# Patient Record
Sex: FEMALE | Race: WHITE | NOT HISPANIC OR LATINO | URBAN - METROPOLITAN AREA
[De-identification: names, ages, dates, MRNs, and addresses within clinical notes are randomized per-mention and may not be internally consistent; named-entity substitution may affect disease eponyms.]

---

## 2019-11-06 ENCOUNTER — ON CAMPUS - OUTPATIENT (AMBULATORY)
Dept: URBAN - METROPOLITAN AREA HOSPITAL 2 | Facility: HOSPITAL | Age: 69
End: 2019-11-06
Payer: MEDICARE

## 2019-11-06 ENCOUNTER — LAB REQUISITION (OUTPATIENT)
Dept: LAB | Facility: HOSPITAL | Age: 69
End: 2019-11-06

## 2019-11-06 ENCOUNTER — OFFICE (AMBULATORY)
Dept: URBAN - METROPOLITAN AREA PATHOLOGY 4 | Facility: PATHOLOGY | Age: 69
End: 2019-11-06
Payer: MEDICARE

## 2019-11-06 VITALS
WEIGHT: 141.4 LBS | SYSTOLIC BLOOD PRESSURE: 91 MMHG | HEART RATE: 95 BPM | DIASTOLIC BLOOD PRESSURE: 70 MMHG | HEART RATE: 73 BPM | SYSTOLIC BLOOD PRESSURE: 101 MMHG | HEIGHT: 64 IN | HEART RATE: 76 BPM | SYSTOLIC BLOOD PRESSURE: 124 MMHG | OXYGEN SATURATION: 100 % | TEMPERATURE: 97.1 F | DIASTOLIC BLOOD PRESSURE: 71 MMHG | DIASTOLIC BLOOD PRESSURE: 54 MMHG | HEART RATE: 70 BPM | SYSTOLIC BLOOD PRESSURE: 108 MMHG | OXYGEN SATURATION: 98 % | RESPIRATION RATE: 16 BRPM | DIASTOLIC BLOOD PRESSURE: 47 MMHG | SYSTOLIC BLOOD PRESSURE: 83 MMHG | RESPIRATION RATE: 15 BRPM | HEART RATE: 86 BPM | SYSTOLIC BLOOD PRESSURE: 115 MMHG | SYSTOLIC BLOOD PRESSURE: 98 MMHG | RESPIRATION RATE: 18 BRPM | DIASTOLIC BLOOD PRESSURE: 63 MMHG | OXYGEN SATURATION: 99 %

## 2019-11-06 DIAGNOSIS — K57.30 DIVERTICULOSIS OF LARGE INTESTINE WITHOUT PERFORATION OR ABSCESS WITHOUT BLEEDING: ICD-10-CM

## 2019-11-06 DIAGNOSIS — K57.30 DIVERTICULOSIS OF LARGE INTESTINE WITHOUT PERFORATION OR ABS: ICD-10-CM

## 2019-11-06 DIAGNOSIS — Z12.11 ENCOUNTER FOR SCREENING FOR MALIGNANT NEOPLASM OF COLON: ICD-10-CM

## 2019-11-06 DIAGNOSIS — D12.5 BENIGN NEOPLASM OF SIGMOID COLON: ICD-10-CM

## 2019-11-06 DIAGNOSIS — D12.2 BENIGN NEOPLASM OF ASCENDING COLON: ICD-10-CM

## 2019-11-06 DIAGNOSIS — D12.3 BENIGN NEOPLASM OF TRANSVERSE COLON: ICD-10-CM

## 2019-11-06 DIAGNOSIS — K64.8 OTHER HEMORRHOIDS: ICD-10-CM

## 2019-11-06 LAB
GI HISTOLOGY: A. UNSPECIFIED: (no result)
GI HISTOLOGY: B. UNSPECIFIED: (no result)
GI HISTOLOGY: C. UNSPECIFIED: (no result)
GI HISTOLOGY: PDF REPORT: (no result)

## 2019-11-06 PROCEDURE — 88305 TISSUE EXAM BY PATHOLOGIST: CPT | Performed by: INTERNAL MEDICINE

## 2019-11-06 PROCEDURE — 88305 TISSUE EXAM BY PATHOLOGIST: CPT | Mod: 26 | Performed by: INTERNAL MEDICINE

## 2019-11-06 PROCEDURE — 45385 COLONOSCOPY W/LESION REMOVAL: CPT | Performed by: INTERNAL MEDICINE

## 2019-11-07 LAB
LAB AP CASE REPORT: NORMAL
PATH REPORT.FINAL DX SPEC: NORMAL
PATH REPORT.GROSS SPEC: NORMAL

## 2021-12-08 ENCOUNTER — VIRTUAL VISIT (OUTPATIENT)
Dept: CONSULT | Facility: CLINIC | Age: 71
End: 2021-12-08
Payer: COMMERCIAL

## 2021-12-08 DIAGNOSIS — I42.8 ARRHYTHMOGENIC RIGHT VENTRICULAR CARDIOMYOPATHY (H): ICD-10-CM

## 2021-12-08 DIAGNOSIS — Z84.89 FAMILY HISTORY OF GENETIC DISEASE: Primary | ICD-10-CM

## 2021-12-08 NOTE — PROGRESS NOTES
How would you like to obtain your AVS? Mail a copy  If the video visit is dropped, the invitation should be resent by: Text to cell phone: [phone]  Will anyone else be joining your video visit? Rebekah Bolanos, EMT

## 2021-12-08 NOTE — PROGRESS NOTES
Name:  Mireya Toro  :   1950  MRN:   [id number]  Date of service: Dec 8, 2021  Primary Provider: No primary care provider on file.  Referring Provider: No ref. provider found    PRESENTING INFORMATION   Reason for consultation:  A consultation in the [facility] was requested for Mireya, a 71 year old female, for targeted familial variant testing     Mireya was accompanied to this visit by her .     History is obtained from Patient and electronic health record. I met with the family at the request of Dr. Edmond to obtain a personal and family history, discuss possible genetic contributions to her symptoms, and to obtain informed consent for genetic testing if indicated.       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  Her son was also found to have a variant of uncertain significance in RYR2 c.4692G>A ([id number]).  This variant in her son does not provide a diagnosis, but further familial segregation studies may be beneficial in classifying this variant.  It is unlikely that familial segregation studies on their own would be sufficient to reclassify this variant, but as more individuals have genetic testing and further research is done on this gene, this may aid in reclassification.  Reclassification could lead to the variant being determined to be benign or pathogenic.  If it is benign, this is not expected to impact health care.  It is it is pathogenic, this can lead to medical management changes due to an increased risk of arrhythmia.  Mireya verbalized understanding that the presence of the variant would not change medical management at this time.  She provided informed consent for genetic testing.     Plan  1. Ordered targeted testing for variants of uncertain significance above and other pathogenic  variants within these same genes at [facility]  Orders Placed This Encounter   Procedures     Other Laboratory; [facility]; Familial Variant Next Generation Sequencing RYR2 (NO TEST CODE) DO NOT BILL PATIENT. (Laboratory Miscellaneous Order)      2. Buccal swab sent to home for sample collection.  3. Pending receipt of sample, results will be returned in approximately 3 weeks and I will call Mireya at that time  4. No further questions.  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 Her son, Rodri was the proband in the family. Mireya had targeted genetic testing following this which identified the presence of the DSP variant.  Rodri was also found to have a variant of uncertain significance in RYR2.  No other relatives have completed testing for this variant.    Mireya has wolly hair similar to her affected son and grandson. She recently had a Holter monitor completed due to DSP variant/ARVC.    She has not been tested for the RYR2 variant.    DISCUSSION  Today we reviewed that our genetic material or DNA is responsible for how our bodies grow and develop. It can be thought of as an instruction manual. This instruction manual is made up of chapters called genes. Our genes are inherited on structures called chromosomes, of which we have 23 pairs for a total of 46. For each chromosome pair, one copy is inherited from the mother and one is inherited from the father. The chromosome pairs are numbered from 1 to 22, and the 23rd pair of chromsomes is called the sex chromsomes. These determine if we are a male or female.     Changes in the chromosomes or in the DNA sequence of a gene can cause the signs and symptoms of a genetic condition because the instructions it is providing to the body have been altered. This can be a small spelling error in the gene, a large duplicated piece of information, or a large missing piece of information.     Today we  reviewed that pathogenic variants may provide a diagnosis whereas variants of uncertain significance do not provide a diagnosis. It takes time for these to be reclassified. Sometimes, familial testing is sufficient to have it reclassified. For cardiac variant with variable ages of onset and reduced penetrance, this is less likely to be the case.  If the variant is reclassified as benign in the future, it would not be expected to impact that person's health.  Changes to health care would therefore not be indicated.  Alternatively, if the variant is upgraded to pathogenic, this provides a diagnosis and may lead to medical management changes.  If and when the lab updates the variant, they will reach out to inform providers of this change.  Nonetheless, we recommended that individuals with variants of uncertain significance call to check in with us every 2 to 5 years to inquire about changes in classification.    Familial variant testing will be sent to [facility].  [facility] sequences the full gene and reports out not only the familial variant but also other pathogenic variants.  We do not expect to find alternative pathogenic variants, but if there is one identified with this testing, it will be disclosed to the patient.    After reviewing the information, patient elected to proceed with genetic testing.  Informed consent obtained and signed with verbal consent.    Billing reviewed with patient.  [facility] does not bill Medicare patients, so bill is not expected from this testing.  If there is a potential charge, [facility] will reach out to patient via text.  Patient may cancel testing at that time.      Sandi Gooden [facility]  Genetic Counselor   [facility]   Phone: [phone]     Time spent in consultation: 30 minutes    CC: Patient

## 2021-12-12 ENCOUNTER — HEALTH MAINTENANCE LETTER (OUTPATIENT)
Age: 71
End: 2021-12-12

## 2022-01-07 ENCOUNTER — TELEPHONE (OUTPATIENT)
Dept: CONSULT | Facility: CLINIC | Age: 72
End: 2022-01-07
Payer: COMMERCIAL

## 2022-01-07 NOTE — TELEPHONE ENCOUNTER
Reason for Call  Called Mireya to discuss the results of Mireya's genetic testing for the familial RYR2 variant of uncertain significance identified in her son, Rodri.     Results  Next Generation Sequencing (NGS) for RYR2 was completed at [facility]. These results were negative. The variant of uncertain significance, RYR2 c.4692G>A ([id number]), was NOT identified in Mireya's sample, nor were any other pathogenic variants in this gene.    Interpretation  This variant may have been new (de holger) in Rodri or inherited from his father, Mireya's  (Dewey). His testing is pending.    Mireya asked about the inheritance pattern of the DSP variant. It is autosomal, meaning males and females can inherit the variant equally. It is dominant, meaning there is a 50% chance of inheriting the variant if your parent has the condition. The RYR2 variant is inherited in the same fashion, however, unlike the DSP variant, we are yet unsure if it causes disease or not. It may be normal human variation.    Plan  1. I will mail results to home alongside interpretation note above  2. I will MyChart message information for Mireya's brother to connect with a genetic counselor ([place])  3. No additional questions or concerns.   4. Contact information provided    Sandi Gooden [facility]  Genetic Counselor   [facility]   Phone: [phone]

## 2022-10-03 ENCOUNTER — HEALTH MAINTENANCE LETTER (OUTPATIENT)
Age: 72
End: 2022-10-03

## 2022-12-27 ENCOUNTER — OFFICE (AMBULATORY)
Dept: URBAN - METROPOLITAN AREA PATHOLOGY 4 | Facility: PATHOLOGY | Age: 72
End: 2022-12-27

## 2022-12-27 ENCOUNTER — OFFICE (AMBULATORY)
Dept: URBAN - METROPOLITAN AREA PATHOLOGY 4 | Facility: PATHOLOGY | Age: 72
End: 2022-12-27
Payer: COMMERCIAL

## 2022-12-27 ENCOUNTER — ON CAMPUS - OUTPATIENT (AMBULATORY)
Dept: URBAN - METROPOLITAN AREA HOSPITAL 2 | Facility: HOSPITAL | Age: 72
End: 2022-12-27

## 2022-12-27 VITALS
RESPIRATION RATE: 13 BRPM | HEART RATE: 77 BPM | SYSTOLIC BLOOD PRESSURE: 100 MMHG | DIASTOLIC BLOOD PRESSURE: 47 MMHG | HEART RATE: 84 BPM | RESPIRATION RATE: 16 BRPM | DIASTOLIC BLOOD PRESSURE: 56 MMHG | WEIGHT: 141 LBS | SYSTOLIC BLOOD PRESSURE: 104 MMHG | SYSTOLIC BLOOD PRESSURE: 105 MMHG | DIASTOLIC BLOOD PRESSURE: 67 MMHG | SYSTOLIC BLOOD PRESSURE: 94 MMHG | DIASTOLIC BLOOD PRESSURE: 57 MMHG | TEMPERATURE: 96.9 F | HEART RATE: 80 BPM | DIASTOLIC BLOOD PRESSURE: 80 MMHG | RESPIRATION RATE: 15 BRPM | SYSTOLIC BLOOD PRESSURE: 110 MMHG | SYSTOLIC BLOOD PRESSURE: 102 MMHG | OXYGEN SATURATION: 99 % | DIASTOLIC BLOOD PRESSURE: 68 MMHG | HEART RATE: 76 BPM | OXYGEN SATURATION: 100 % | HEART RATE: 106 BPM | DIASTOLIC BLOOD PRESSURE: 64 MMHG | DIASTOLIC BLOOD PRESSURE: 60 MMHG | HEART RATE: 78 BPM | OXYGEN SATURATION: 98 % | SYSTOLIC BLOOD PRESSURE: 99 MMHG | HEIGHT: 64 IN | SYSTOLIC BLOOD PRESSURE: 134 MMHG

## 2022-12-27 DIAGNOSIS — K63.5 POLYP OF COLON: ICD-10-CM

## 2022-12-27 DIAGNOSIS — Z86.010 PERSONAL HISTORY OF COLONIC POLYPS: ICD-10-CM

## 2022-12-27 DIAGNOSIS — K57.30 DIVERTICULOSIS OF LARGE INTESTINE WITHOUT PERFORATION OR ABS: ICD-10-CM

## 2022-12-27 DIAGNOSIS — D12.4 BENIGN NEOPLASM OF DESCENDING COLON: ICD-10-CM

## 2022-12-27 LAB
GI HISTOLOGY: A. UNSPECIFIED: (no result)
GI HISTOLOGY: PDF REPORT: (no result)

## 2022-12-27 PROCEDURE — 45385 COLONOSCOPY W/LESION REMOVAL: CPT | Mod: PT | Performed by: INTERNAL MEDICINE

## 2022-12-27 PROCEDURE — 88305 TISSUE EXAM BY PATHOLOGIST: CPT | Mod: 26 | Performed by: INTERNAL MEDICINE

## 2023-02-11 ENCOUNTER — HEALTH MAINTENANCE LETTER (OUTPATIENT)
Age: 73
End: 2023-02-11

## 2023-12-30 ENCOUNTER — HEALTH MAINTENANCE LETTER (OUTPATIENT)
Age: 73
End: 2023-12-30

## 2024-03-09 ENCOUNTER — HEALTH MAINTENANCE LETTER (OUTPATIENT)
Age: 74
End: 2024-03-09